# Patient Record
Sex: MALE | Race: WHITE | Employment: STUDENT | URBAN - NONMETROPOLITAN AREA
[De-identification: names, ages, dates, MRNs, and addresses within clinical notes are randomized per-mention and may not be internally consistent; named-entity substitution may affect disease eponyms.]

---
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
## 2024-04-22 ENCOUNTER — OFFICE VISIT (OUTPATIENT)
Age: 19
End: 2024-04-22

## 2024-04-22 VITALS
HEIGHT: 78 IN | BODY MASS INDEX: 22.19 KG/M2 | TEMPERATURE: 98.4 F | DIASTOLIC BLOOD PRESSURE: 82 MMHG | SYSTOLIC BLOOD PRESSURE: 128 MMHG | HEART RATE: 80 BPM

## 2024-04-22 DIAGNOSIS — B27.90 INFECTIOUS MONONUCLEOSIS WITHOUT COMPLICATION, INFECTIOUS MONONUCLEOSIS DUE TO UNSPECIFIED ORGANISM: Primary | ICD-10-CM

## 2024-04-22 LAB
HETEROPHILE ANTIBODIES: POSITIVE
INFLUENZA VIRUS A RNA: NEGATIVE
INFLUENZA VIRUS B RNA: NEGATIVE
STREPTOCOCCUS A RNA: NEGATIVE

## 2024-04-22 PROCEDURE — 87502 INFLUENZA DNA AMP PROBE: CPT | Performed by: NURSE PRACTITIONER

## 2024-04-22 PROCEDURE — 87651 STREP A DNA AMP PROBE: CPT | Performed by: NURSE PRACTITIONER

## 2024-04-22 PROCEDURE — 99213 OFFICE O/P EST LOW 20 MIN: CPT | Performed by: NURSE PRACTITIONER

## 2024-04-22 PROCEDURE — 86308 HETEROPHILE ANTIBODY SCREEN: CPT | Performed by: NURSE PRACTITIONER

## 2024-04-22 RX ORDER — METHYLPREDNISOLONE 4 MG/1
TABLET ORAL
Qty: 1 KIT | Refills: 0 | Status: SHIPPED | OUTPATIENT
Start: 2024-04-22 | End: 2024-04-28

## 2024-04-22 ASSESSMENT — PATIENT HEALTH QUESTIONNAIRE - PHQ9
SUM OF ALL RESPONSES TO PHQ QUESTIONS 1-9: 0
2. FEELING DOWN, DEPRESSED OR HOPELESS: NOT AT ALL
1. LITTLE INTEREST OR PLEASURE IN DOING THINGS: NOT AT ALL
SUM OF ALL RESPONSES TO PHQ9 QUESTIONS 1 & 2: 0

## 2024-04-22 ASSESSMENT — ENCOUNTER SYMPTOMS
VOMITING: 0
CHEST TIGHTNESS: 0
NAUSEA: 0
TROUBLE SWALLOWING: 0
WHEEZING: 0
COUGH: 0
ABDOMINAL PAIN: 0
DIARRHEA: 0
SORE THROAT: 1
SHORTNESS OF BREATH: 0

## 2024-04-22 NOTE — PROGRESS NOTES
[facility] [place] [facility]  [facility] - [facility]  [address] [facility] [address]  Dept: [phone]  Loc: [phone]     Ger Tomlinson is a 18 y.o. male who presents today for:  Chief Complaint   Patient presents with    Congestion     Has been having congestion, sore throat x 5  days, OTC  mucinex        Assessment/Plan:     1. Infectious mononucleosis without complication, infectious mononucleosis due to unspecified organism  -strep neg, mono pos.  No splenomegaly.  Will rx steroids d/t swollen tonsils- last steroids >6 months ago.  Pt plays basketball at [employer]  -go to ER if develop trouble breathing/swallowing, drooling, jaw stiffness, one tonsil is larger than the other, or tonsil is touching uvula.  f/u if no improvement in 48-72 hours.   -Avoid contact sports and physical activity that may result in injury for 4 weeks.  Follow up immediately if develop abdominal pain especially on the left side.  Avoid sharing drinks with others.   -f/u in 3 weeks       Results for orders placed or performed in visit on 04/22/24   POCT Rapid Strep A DNA (Ale[facility] i)   Result Value Ref Range    Streptococcus A RNA Negative    POCT Influenza A/B DNA (Ale[facility] i)   Result Value Ref Range    Influenza virus A RNA Negative     Influenza virus B RNA Negative    POCT Infectious Mononucleosis Abs (mono)   Result Value Ref Range    Monospot Positive         Medications Ordered:  Orders Placed This Encounter   Medications    methylPREDNISolone (MEDROL DOSEPACK) 4 MG tablet     Sig: Take as directed     Dispense:  1 kit     Refill:  0       Return in about 3 weeks (around 5/13/2024).    Future Appointments   Date Time Provider Department Center   5/8/2024 12:00 PM Eden Kurtz, BHARGAVI - CNP [facility]       HPI:   Pt presents with congestion, sore throat, and fever x5 days.  Sxs worsening.  Tmax \"100F.\"  Has taken Mucinex with some relief.  Denies abd pain and trouble

## 2024-04-22 NOTE — PATIENT INSTRUCTIONS
Avoid contact sports and physical activity that may result in injury for 4 weeks.  Follow up immediately if develop abdominal pain especially on the left side.  Avoid sharing drinks with others.

## 2024-05-08 ENCOUNTER — OFFICE VISIT (OUTPATIENT)
Age: 19
End: 2024-05-08

## 2024-05-08 VITALS
RESPIRATION RATE: 16 BRPM | SYSTOLIC BLOOD PRESSURE: 108 MMHG | BODY MASS INDEX: 23.72 KG/M2 | HEIGHT: 78 IN | TEMPERATURE: 98.6 F | OXYGEN SATURATION: 98 % | HEART RATE: 84 BPM | DIASTOLIC BLOOD PRESSURE: 80 MMHG | WEIGHT: 205 LBS

## 2024-05-08 DIAGNOSIS — B27.90 INFECTIOUS MONONUCLEOSIS WITHOUT COMPLICATION, INFECTIOUS MONONUCLEOSIS DUE TO UNSPECIFIED ORGANISM: Primary | ICD-10-CM

## 2024-05-08 PROCEDURE — 99213 OFFICE O/P EST LOW 20 MIN: CPT | Performed by: NURSE PRACTITIONER

## 2024-05-08 ASSESSMENT — ENCOUNTER SYMPTOMS
SORE THROAT: 1
NAUSEA: 0
WHEEZING: 0
SHORTNESS OF BREATH: 0
VOMITING: 0
ABDOMINAL PAIN: 0
COUGH: 0
CHEST TIGHTNESS: 0
TROUBLE SWALLOWING: 0

## 2024-05-08 NOTE — PROGRESS NOTES
[facility] [place] [facility]  [facility] - [place] [address] [facility] [address]  Dept: [phone]  Loc: [phone]     Ger Tomlinson is a 18 y.o. male who presents today for:  Chief Complaint   Patient presents with    Follow-up     After mono check up       Assessment/Plan:     1. Infectious mononucleosis without complication, infectious mononucleosis due to unspecified organism  -tonsils still enlarged however are improved from previous visit.  Abd soft, no splenomegaly.  Pt able to start non-contact physical activity at 's discretion.  F/u ASAP if develop abd pain  -f/u in 1 week       No results found for any visits on 05/08/24.     Medications Ordered:  No orders of the defined types were placed in this encounter.      Return in about 1 week (around 5/15/2024).    Future Appointments   Date Time Provider Department Center   5/8/2024 12:00 PM [facility] [place]   5/15/2024 10:00 AM Eden Kurtz APRN - CNP [facility]       HPI:   Pt presents for 3 week mono f/u.  States he still has occasional sore throat while eating but overall this is improving.  Denies abd pain, fever, and N/V.  Reports some intermittent fatigue.  He states overall he's feeling a lot better.      Subjective:      Review of Systems   Constitutional:  Positive for fatigue. Negative for chills and fever.   HENT:  Positive for sore throat. Negative for congestion and trouble swallowing.    Respiratory:  Negative for cough, chest tightness, shortness of breath and wheezing.    Cardiovascular:  Negative for chest pain and palpitations.   Gastrointestinal:  Negative for abdominal pain, nausea and vomiting.   Skin:  Negative for rash.   Neurological:  Negative for dizziness, light-headedness and headaches.         Objective:     Vitals:    05/08/24 1113   BP: 108/80   Pulse: 84   Resp: 16   Temp: 98.6 °F (37 °C)   SpO2: 98%   Weight: 93 kg

## 2024-05-15 ENCOUNTER — OFFICE VISIT (OUTPATIENT)
Age: 19
End: 2024-05-15

## 2024-05-15 VITALS
RESPIRATION RATE: 16 BRPM | SYSTOLIC BLOOD PRESSURE: 120 MMHG | DIASTOLIC BLOOD PRESSURE: 90 MMHG | OXYGEN SATURATION: 97 % | BODY MASS INDEX: 23.6 KG/M2 | HEIGHT: 78 IN | HEART RATE: 86 BPM | WEIGHT: 204 LBS | TEMPERATURE: 97.3 F

## 2024-05-15 DIAGNOSIS — B27.90 INFECTIOUS MONONUCLEOSIS WITHOUT COMPLICATION, INFECTIOUS MONONUCLEOSIS DUE TO UNSPECIFIED ORGANISM: Primary | ICD-10-CM

## 2024-05-15 PROCEDURE — 99213 OFFICE O/P EST LOW 20 MIN: CPT | Performed by: NURSE PRACTITIONER

## 2024-05-15 ASSESSMENT — ENCOUNTER SYMPTOMS
COUGH: 0
CHEST TIGHTNESS: 0
ABDOMINAL PAIN: 0
NAUSEA: 0
DIARRHEA: 0
SHORTNESS OF BREATH: 0
VOMITING: 0
SORE THROAT: 0
WHEEZING: 0

## 2024-05-15 NOTE — PROGRESS NOTES
[facility] [place] [facility]  [facility] - [place] [address] [address] [facility] [address]  Dept: [phone]  Loc: [phone]     Ger Tomlinson is a 18 y.o. male who presents today for:  Chief Complaint   Patient presents with    Follow-up       Assessment/Plan:     1. Infectious mononucleosis without complication, infectious mononucleosis due to unspecified organism  -abd soft and non-tender.  Tonsils remain enlarged, advised pt f/u with PCP if no improvement within 2-4 weeks.    -stop physical activity and f/u immediately if develop abd pain, chest pain, SOB, dizziness, heart palpitations, or trouble breathing.  Pt verbalized understanding       No results found for any visits on 05/15/24.     Medications Ordered:  No orders of the defined types were placed in this encounter.      No follow-ups on file.    No future appointments.    HPI:   Pt presents for 4 week mono f/u.  Started non-contact physical activity last week and has been tolerating well.  Denies abd pain and sore throat.  No chest pain, heart palpitations, chest tightness, dizziness, or trouble breathing.      Subjective:      Review of Systems   Constitutional:  Negative for chills and fever.   HENT:  Negative for congestion and sore throat.    Respiratory:  Negative for cough, chest tightness, shortness of breath and wheezing.    Cardiovascular:  Negative for chest pain, palpitations and leg swelling.   Gastrointestinal:  Negative for abdominal pain, diarrhea, nausea and vomiting.   Neurological:  Negative for dizziness, light-headedness and headaches.   Hematological:  Negative for adenopathy.         Objective:     Vitals:    05/15/24 0953   BP: (!) 120/90   Pulse: 86   Resp: 16   Temp: 97.3 °F (36.3 °C)   TempSrc: Tympanic   SpO2: 97%   Weight: 92.5 kg (204 lb)   Height: 2.057 m (6' 9\")       Body mass index is 21.86 kg/m².    Wt Readings from Last 3 Encounters:   05/15/24 92.5 kg (204 lb) (94 %, Z= 1.54)*

## 2024-09-09 ENCOUNTER — OFFICE VISIT (OUTPATIENT)
Age: 19
End: 2024-09-09

## 2024-09-09 VITALS
TEMPERATURE: 98 F | DIASTOLIC BLOOD PRESSURE: 65 MMHG | BODY MASS INDEX: 24.07 KG/M2 | HEIGHT: 78 IN | WEIGHT: 208 LBS | HEART RATE: 77 BPM | OXYGEN SATURATION: 95 % | SYSTOLIC BLOOD PRESSURE: 125 MMHG

## 2024-09-09 DIAGNOSIS — J01.00 ACUTE NON-RECURRENT MAXILLARY SINUSITIS: Primary | ICD-10-CM

## 2024-09-09 LAB
Lab: NORMAL
QC PASS/FAIL: NORMAL
SARS-COV-2 RDRP RESP QL NAA+PROBE: NEGATIVE

## 2024-09-09 PROCEDURE — 87635 SARS-COV-2 COVID-19 AMP PRB: CPT | Performed by: NURSE PRACTITIONER

## 2024-09-09 PROCEDURE — 99213 OFFICE O/P EST LOW 20 MIN: CPT | Performed by: NURSE PRACTITIONER

## 2024-09-09 ASSESSMENT — ENCOUNTER SYMPTOMS
SHORTNESS OF BREATH: 0
WHEEZING: 0
NAUSEA: 0
SORE THROAT: 0
COUGH: 1
VOMITING: 0
SINUS PRESSURE: 1
DIARRHEA: 0
CHEST TIGHTNESS: 0

## 2024-09-16 ENCOUNTER — OFFICE VISIT (OUTPATIENT)
Age: 19
End: 2024-09-16

## 2024-09-16 VITALS
DIASTOLIC BLOOD PRESSURE: 62 MMHG | OXYGEN SATURATION: 98 % | HEIGHT: 78 IN | SYSTOLIC BLOOD PRESSURE: 102 MMHG | WEIGHT: 208 LBS | TEMPERATURE: 97.8 F | BODY MASS INDEX: 24.07 KG/M2 | HEART RATE: 48 BPM | RESPIRATION RATE: 16 BRPM

## 2024-09-16 DIAGNOSIS — J01.91 ACUTE RECURRENT SINUSITIS, UNSPECIFIED LOCATION: Primary | ICD-10-CM

## 2024-09-16 PROCEDURE — 99213 OFFICE O/P EST LOW 20 MIN: CPT | Performed by: NURSE PRACTITIONER

## 2024-09-16 RX ORDER — AZELASTINE 1 MG/ML
1 SPRAY, METERED NASAL 2 TIMES DAILY
Qty: 60 ML | Refills: 0 | Status: SHIPPED | OUTPATIENT
Start: 2024-09-16

## 2024-09-16 ASSESSMENT — ENCOUNTER SYMPTOMS
VOMITING: 0
CHEST TIGHTNESS: 0
SINUS PRESSURE: 0
WHEEZING: 0
SHORTNESS OF BREATH: 0
SORE THROAT: 0
DIARRHEA: 0
ABDOMINAL PAIN: 0
COUGH: 1
NAUSEA: 0

## 2025-02-06 ENCOUNTER — OFFICE VISIT (OUTPATIENT)
Age: 20
End: 2025-02-06

## 2025-02-06 VITALS
OXYGEN SATURATION: 98 % | DIASTOLIC BLOOD PRESSURE: 84 MMHG | SYSTOLIC BLOOD PRESSURE: 134 MMHG | HEART RATE: 60 BPM | BODY MASS INDEX: 22.07 KG/M2 | TEMPERATURE: 96.2 F | WEIGHT: 206 LBS

## 2025-02-06 DIAGNOSIS — K52.9 ACUTE GASTROENTERITIS: Primary | ICD-10-CM

## 2025-02-06 PROBLEM — J30.9 ALLERGIC RHINITIS: Status: RESOLVED | Noted: 2023-11-29 | Resolved: 2025-02-06

## 2025-02-06 LAB
INFLUENZA VIRUS A RNA: NEGATIVE
INFLUENZA VIRUS B RNA: NEGATIVE

## 2025-02-06 PROCEDURE — 99213 OFFICE O/P EST LOW 20 MIN: CPT | Performed by: NURSE PRACTITIONER

## 2025-02-06 PROCEDURE — 87502 INFLUENZA DNA AMP PROBE: CPT | Performed by: NURSE PRACTITIONER

## 2025-02-06 SDOH — ECONOMIC STABILITY: FOOD INSECURITY: WITHIN THE PAST 12 MONTHS, THE FOOD YOU BOUGHT JUST DIDN'T LAST AND YOU DIDN'T HAVE MONEY TO GET MORE.: NEVER TRUE

## 2025-02-06 SDOH — ECONOMIC STABILITY: FOOD INSECURITY: WITHIN THE PAST 12 MONTHS, YOU WORRIED THAT YOUR FOOD WOULD RUN OUT BEFORE YOU GOT MONEY TO BUY MORE.: NEVER TRUE

## 2025-02-06 ASSESSMENT — PATIENT HEALTH QUESTIONNAIRE - PHQ9
SUM OF ALL RESPONSES TO PHQ QUESTIONS 1-9: 0
SUM OF ALL RESPONSES TO PHQ9 QUESTIONS 1 & 2: 0
2. FEELING DOWN, DEPRESSED OR HOPELESS: NOT AT ALL
1. LITTLE INTEREST OR PLEASURE IN DOING THINGS: NOT AT ALL
SUM OF ALL RESPONSES TO PHQ QUESTIONS 1-9: 0

## 2025-02-06 ASSESSMENT — ENCOUNTER SYMPTOMS
ABDOMINAL PAIN: 1
DIARRHEA: 1
VOMITING: 0
CHEST TIGHTNESS: 0
SHORTNESS OF BREATH: 0
SORE THROAT: 0
NAUSEA: 1
COUGH: 1
BLOOD IN STOOL: 0
WHEEZING: 0

## 2025-02-06 NOTE — PROGRESS NOTES
[facility] [place] [facility]  [facility]  [address] [facility] [address]  Dept: [phone]  Loc: [phone]     Ger Tomlinson is a 19 y.o. male who presents today for:  Chief Complaint   Patient presents with    Other     Nausea, stomach pain, no vomiting x1 day. Became worse last night. Has not felt well for a day or two. Congestion, tired       Assessment/Plan:     1. Acute gastroenteritis  -flu neg.  Abd soft and non-tender.  MMM.  He declines ondansetron  -Clear liquid diet and advance as tolerated.  Follow-up if no improvement in symptoms in 24-48 hours.  Go to ER if you develop blood in your stool or vomit, symptoms don't improve within 24-48 hours, symptoms worsen, or you're unable to keep water down.  Monitor for symptoms of dehydration including weakness, dizziness, dark-colored urine, confusion, heart palpitations- if you experience any of these, go to an Urgent Care or ER.        Results for orders placed or performed in visit on 02/06/25   POCT Influenza A/B DNA (Alere i)   Result Value Ref Range    Influenza virus A RNA Negative     Influenza virus B RNA Negative         Medications Ordered:  No orders of the defined types were placed in this encounter.      No follow-ups on file.    No future appointments.    HPI:   Pt presents with slight cough, nausea, upset stomach, congestion, diarrhea, and fatigue x1-2 days.  Denies vomiting, fever, body aches, and sore throat.  No blood in stool.  Has not taken any medication for sxs.      Subjective:      Review of Systems   Constitutional:  Positive for fatigue. Negative for chills and fever.   HENT:  Positive for congestion. Negative for sore throat.    Respiratory:  Positive for cough. Negative for chest tightness, shortness of breath and wheezing.    Cardiovascular:  Negative for chest pain and palpitations.   Gastrointestinal:  Positive for abdominal pain, diarrhea and nausea. Negative for blood in

## 2025-02-06 NOTE — PATIENT INSTRUCTIONS
Clear liquid diet and advance as tolerated.  Follow-up if no improvement in symptoms in 24-48 hours.  Go to ER if you develop blood in your stool or vomit, symptoms don't improve within 24-48 hours, symptoms worsen, or you're unable to keep water down.  Monitor for symptoms of dehydration including weakness, dizziness, dark-colored urine, confusion, heart palpitations- if you experience any of these, go to an Urgent Care or ER.